# Patient Record
Sex: MALE | Race: WHITE | Employment: UNEMPLOYED | URBAN - METROPOLITAN AREA
[De-identification: names, ages, dates, MRNs, and addresses within clinical notes are randomized per-mention and may not be internally consistent; named-entity substitution may affect disease eponyms.]

---

## 2022-01-10 ENCOUNTER — OFFICE VISIT (OUTPATIENT)
Dept: SURGERY | Facility: CLINIC | Age: 62
End: 2022-01-10
Payer: MEDICAID

## 2022-01-10 DIAGNOSIS — D21.12 BENIGN NEOPLASM OF CONNECTIVE AND SOFT TISSUE OF LEFT UPPER EXTREMITY, INCLUDING SHOULDER: Primary | ICD-10-CM

## 2022-01-10 PROCEDURE — 99243 OFF/OP CNSLTJ NEW/EST LOW 30: CPT | Performed by: PLASTIC SURGERY

## 2022-01-10 RX ORDER — HYDROCODONE BITARTRATE AND ACETAMINOPHEN 7.5; 325 MG/1; MG/1
1 TABLET ORAL
Qty: 10 TABLET | Refills: 0 | Status: SHIPPED | OUTPATIENT
Start: 2022-01-10

## 2022-01-10 NOTE — H&P
Maykel Daniel is a 57year old male that presents with Patient presents with:  Ganglion: Left hand   .     REFERRED BY:  Loretta Rodriguez    Pacemaker: No  Latex Allergy: no  Coumadin: No  Plavix: No  Other anticoagulants: No  Cardiac stents: No    HAND D Right: Normal, Left: Normal  NECK/THYROID: Inspection - Normal, Palpation - Normal, Thyroid gland - Normal, No adenopathy  RESPIRATORY: Inspection - Normal, Effort - Normal  CARDIOVASCULAR: Regular rhythm, No murmurs  ABDOMEN: Inspection - Normal, No abdom

## 2022-01-10 NOTE — PROGRESS NOTES
Patient request for surgery signed by patient and witnessed and signed by RN. Prescription for Norco and narcotic prescription electronically sent to pharmacy per Dr. Anne Worrell order and patient instructed to  prescription before surgery.    Pre-

## 2022-01-10 NOTE — H&P (VIEW-ONLY)
Essie Adamson is a 57year old male that presents with Patient presents with:  Ganglion: Left hand   . REFERRED BY:  Loretta Rodriguez    Pacemaker: No  Latex Allergy: no  Coumadin: No  Plavix: No  Other anticoagulants: No  Cardiac stents: No    HAND DOMINANCE:  Right  Profession: Unemployed     RECONSTRUCTIVE HISTORY    SUN EXPOSURE   Current yes   Past yes   Sunburns no   Tanning salons current no   Tanning salons past no   Radiation treatments No     SKIN CANCER    Personal history of skin cancer: none    HAND     Previous hand injury (personal)    No      HPI:       58-year-old male right-hand-dominant with a left hand mass    10 years duration, recently increasing in size    No pain    Has difficulty grasping items  Numbness to index, middle, ring, small          Review of Systems:   Constitutional: No change in appetite, chill/rigors, or fatigue  GI: No jaundice  Endocrine: No generalized weakness  Neurological: No aphasia, loss of consciousness, or seizures    Musculoskeletal:     Duration  ~ 10 years, recently increasing in size     Location:            Left  Volar hand        Size: increased    Pain:  no    Functional problems:  yes difficulty grasping items     Spontaneously occurred   Constant numbness to all left hand fingers   ~ 2.5 x 2.5 round, firm mass       PMH:     MEDICAL  History reviewed. No pertinent past medical history. SURGICAL  History reviewed. No pertinent surgical history. ALLERIGIES  No Known Allergies     MEDICATIONS  No current outpatient medications on file. SOCIAL HISTORY  Social History    Tobacco Use      Smoking status: Current Every Day Smoker      Smokeless tobacco: Never Used    Alcohol use: Not on file    Drug use: Not on file       FAMILY HISTORY  History reviewed. No pertinent family history.        PHYSICAL EXAM:     CONSTITUTIONAL: Overall appearance - Normal  HEENT: Normocephalic  EYES: Conjunctiva - Right: Normal, Left: Normal; EOMI  EARS: Inspection - Right: Normal, Left: Normal  NECK/THYROID: Inspection - Normal, Palpation - Normal, Thyroid gland - Normal, No adenopathy  RESPIRATORY: Inspection - Normal, Effort - Normal  CARDIOVASCULAR: Regular rhythm, No murmurs  ABDOMEN: Inspection - Normal, No abdominal tenderness  NEURO: Memory intact  PSYCH: Oriented to person, place, time, and situation, Appropriate mood and affect      Hand Physical Exam:     Left volar radial palm: 2 cm firm cystic mass with overlying thin skin    No Tinel's over the mass    Tinel median wrist positive and exploring    ASSESSMENT/PLAN:     MASS      left palm    We discussed what a ganglion/mass is, including treatment options. Questions were answered and the patient wishes to proceed with treatment. This ganglion/mass should be excised. I discussed the procedure at length, including post-operative course and risks as indicated on the Surgical Request Form. Therapy will be necessary post-operatively. The mass could recur (1-5%). POST-OPERATIVE  PROTOCOL:  We discussed post-operative restrictions at length. It is critical to maintain the splint post-operatively and to comply with post-operative instructions. The splint must be carefully cared for, must remain dry,  and cannot be removed under any circumstance. Consistent elevation of the hand above heart level is critical.  Therapy will be necessary post-operatively. Failure to comply with post-operative instructions, including therapy, will have significant impact on the final result, and could lead to permanent pain, stiffness, weakness, and loss of function. Even with satisfactory healing, the hand/digit may not regain normal ROM or normal function. Skin excision will necessitate the need for a local flap. The numbness is not from the mass, but probably early carpal tunnel. 1/10/2022  Eleuterio Ambrose MD      [facility] Data Reviewed.

## 2022-01-27 ENCOUNTER — LAB ENCOUNTER (OUTPATIENT)
Dept: LAB | Facility: HOSPITAL | Age: 62
End: 2022-01-27
Attending: PLASTIC SURGERY
Payer: MEDICAID

## 2022-01-27 DIAGNOSIS — Z01.818 PREOP TESTING: ICD-10-CM

## 2022-01-27 LAB — SARS-COV-2 RNA RESP QL NAA+PROBE: NOT DETECTED

## 2022-01-28 ENCOUNTER — HOSPITAL DOCUMENTATION (OUTPATIENT)
Dept: SURGERY | Facility: CLINIC | Age: 62
End: 2022-01-28

## 2022-01-28 ENCOUNTER — ANESTHESIA EVENT (OUTPATIENT)
Dept: SURGERY | Facility: HOSPITAL | Age: 62
End: 2022-01-28
Payer: MEDICAID

## 2022-01-28 ENCOUNTER — HOSPITAL ENCOUNTER (OUTPATIENT)
Facility: HOSPITAL | Age: 62
Setting detail: HOSPITAL OUTPATIENT SURGERY
Discharge: HOME | End: 2022-01-28
Attending: PLASTIC SURGERY | Admitting: PLASTIC SURGERY
Payer: MEDICAID

## 2022-01-28 ENCOUNTER — ANESTHESIA (OUTPATIENT)
Dept: SURGERY | Facility: HOSPITAL | Age: 62
End: 2022-01-28
Payer: MEDICAID

## 2022-01-28 VITALS
BODY MASS INDEX: 20.05 KG/M2 | HEART RATE: 67 BPM | TEMPERATURE: 97 F | HEIGHT: 72 IN | RESPIRATION RATE: 16 BRPM | DIASTOLIC BLOOD PRESSURE: 84 MMHG | SYSTOLIC BLOOD PRESSURE: 148 MMHG | OXYGEN SATURATION: 95 % | WEIGHT: 148 LBS

## 2022-01-28 DIAGNOSIS — L72.0 EPIDERMAL CYST: Primary | ICD-10-CM

## 2022-01-28 DIAGNOSIS — D21.12 BENIGN NEOPLASM OF CONNECTIVE AND SOFT TISSUE OF LEFT UPPER EXTREMITY, INCLUDING SHOULDER: ICD-10-CM

## 2022-01-28 DIAGNOSIS — Z01.818 PREOP TESTING: Primary | ICD-10-CM

## 2022-01-28 PROCEDURE — 0HXGXZZ TRANSFER LEFT HAND SKIN, EXTERNAL APPROACH: ICD-10-PCS | Performed by: PLASTIC SURGERY

## 2022-01-28 PROCEDURE — 0HBGXZZ EXCISION OF LEFT HAND SKIN, EXTERNAL APPROACH: ICD-10-PCS | Performed by: PLASTIC SURGERY

## 2022-01-28 PROCEDURE — 14040 TIS TRNFR F/C/C/M/N/A/G/H/F: CPT | Performed by: PLASTIC SURGERY

## 2022-01-28 PROCEDURE — 0JCK0ZZ EXTIRPATION OF MATTER FROM LEFT HAND SUBCUTANEOUS TISSUE AND FASCIA, OPEN APPROACH: ICD-10-PCS | Performed by: PLASTIC SURGERY

## 2022-01-28 RX ORDER — PROCHLORPERAZINE EDISYLATE 5 MG/ML
5 INJECTION INTRAMUSCULAR; INTRAVENOUS ONCE AS NEEDED
Status: DISCONTINUED | OUTPATIENT
Start: 2022-01-28 | End: 2022-01-28

## 2022-01-28 RX ORDER — LIDOCAINE HYDROCHLORIDE 10 MG/ML
INJECTION, SOLUTION EPIDURAL; INFILTRATION; INTRACAUDAL; PERINEURAL AS NEEDED
Status: DISCONTINUED | OUTPATIENT
Start: 2022-01-28 | End: 2022-01-28 | Stop reason: SURG

## 2022-01-28 RX ORDER — HYDROMORPHONE HYDROCHLORIDE 1 MG/ML
0.4 INJECTION, SOLUTION INTRAMUSCULAR; INTRAVENOUS; SUBCUTANEOUS EVERY 5 MIN PRN
Status: DISCONTINUED | OUTPATIENT
Start: 2022-01-28 | End: 2022-01-28

## 2022-01-28 RX ORDER — HALOPERIDOL 5 MG/ML
0.25 INJECTION INTRAMUSCULAR ONCE AS NEEDED
Status: DISCONTINUED | OUTPATIENT
Start: 2022-01-28 | End: 2022-01-28

## 2022-01-28 RX ORDER — MORPHINE SULFATE 4 MG/ML
4 INJECTION, SOLUTION INTRAMUSCULAR; INTRAVENOUS EVERY 10 MIN PRN
Status: DISCONTINUED | OUTPATIENT
Start: 2022-01-28 | End: 2022-01-28

## 2022-01-28 RX ORDER — ONDANSETRON 2 MG/ML
4 INJECTION INTRAMUSCULAR; INTRAVENOUS ONCE AS NEEDED
Status: DISCONTINUED | OUTPATIENT
Start: 2022-01-28 | End: 2022-01-28

## 2022-01-28 RX ORDER — SODIUM CHLORIDE, SODIUM LACTATE, POTASSIUM CHLORIDE, CALCIUM CHLORIDE 600; 310; 30; 20 MG/100ML; MG/100ML; MG/100ML; MG/100ML
INJECTION, SOLUTION INTRAVENOUS CONTINUOUS
Status: DISCONTINUED | OUTPATIENT
Start: 2022-01-28 | End: 2022-01-28

## 2022-01-28 RX ORDER — EPHEDRINE SULFATE 50 MG/ML
INJECTION INTRAVENOUS AS NEEDED
Status: DISCONTINUED | OUTPATIENT
Start: 2022-01-28 | End: 2022-01-28 | Stop reason: SURG

## 2022-01-28 RX ORDER — MORPHINE SULFATE 10 MG/ML
6 INJECTION, SOLUTION INTRAMUSCULAR; INTRAVENOUS EVERY 10 MIN PRN
Status: DISCONTINUED | OUTPATIENT
Start: 2022-01-28 | End: 2022-01-28

## 2022-01-28 RX ORDER — HYDROMORPHONE HYDROCHLORIDE 1 MG/ML
0.6 INJECTION, SOLUTION INTRAMUSCULAR; INTRAVENOUS; SUBCUTANEOUS EVERY 5 MIN PRN
Status: DISCONTINUED | OUTPATIENT
Start: 2022-01-28 | End: 2022-01-28

## 2022-01-28 RX ORDER — HYDROCODONE BITARTRATE AND ACETAMINOPHEN 7.5; 325 MG/1; MG/1
1 TABLET ORAL EVERY 4 HOURS PRN
Status: DISCONTINUED | OUTPATIENT
Start: 2022-01-28 | End: 2022-01-28

## 2022-01-28 RX ORDER — MORPHINE SULFATE 4 MG/ML
2 INJECTION, SOLUTION INTRAMUSCULAR; INTRAVENOUS EVERY 10 MIN PRN
Status: DISCONTINUED | OUTPATIENT
Start: 2022-01-28 | End: 2022-01-28

## 2022-01-28 RX ORDER — HYDROMORPHONE HYDROCHLORIDE 1 MG/ML
0.2 INJECTION, SOLUTION INTRAMUSCULAR; INTRAVENOUS; SUBCUTANEOUS EVERY 5 MIN PRN
Status: DISCONTINUED | OUTPATIENT
Start: 2022-01-28 | End: 2022-01-28

## 2022-01-28 RX ORDER — ACETAMINOPHEN 500 MG
1000 TABLET ORAL ONCE
Status: COMPLETED | OUTPATIENT
Start: 2022-01-28 | End: 2022-01-28

## 2022-01-28 RX ORDER — NALOXONE HYDROCHLORIDE 0.4 MG/ML
80 INJECTION, SOLUTION INTRAMUSCULAR; INTRAVENOUS; SUBCUTANEOUS AS NEEDED
Status: DISCONTINUED | OUTPATIENT
Start: 2022-01-28 | End: 2022-01-28

## 2022-01-28 RX ORDER — MIDAZOLAM HYDROCHLORIDE 1 MG/ML
INJECTION INTRAMUSCULAR; INTRAVENOUS AS NEEDED
Status: DISCONTINUED | OUTPATIENT
Start: 2022-01-28 | End: 2022-01-28 | Stop reason: SURG

## 2022-01-28 RX ORDER — KETOROLAC TROMETHAMINE 30 MG/ML
INJECTION, SOLUTION INTRAMUSCULAR; INTRAVENOUS AS NEEDED
Status: DISCONTINUED | OUTPATIENT
Start: 2022-01-28 | End: 2022-01-28 | Stop reason: SURG

## 2022-01-28 RX ORDER — HYDROCODONE BITARTRATE AND ACETAMINOPHEN 5; 325 MG/1; MG/1
1 TABLET ORAL AS NEEDED
Status: DISCONTINUED | OUTPATIENT
Start: 2022-01-28 | End: 2022-01-28

## 2022-01-28 RX ORDER — HYDROCODONE BITARTRATE AND ACETAMINOPHEN 5; 325 MG/1; MG/1
2 TABLET ORAL AS NEEDED
Status: DISCONTINUED | OUTPATIENT
Start: 2022-01-28 | End: 2022-01-28

## 2022-01-28 RX ADMIN — EPHEDRINE SULFATE 10 MG: 50 INJECTION INTRAVENOUS at 11:48:00

## 2022-01-28 RX ADMIN — SODIUM CHLORIDE, SODIUM LACTATE, POTASSIUM CHLORIDE, CALCIUM CHLORIDE: 600; 310; 30; 20 INJECTION, SOLUTION INTRAVENOUS at 11:26:00

## 2022-01-28 RX ADMIN — KETOROLAC TROMETHAMINE 30 MG: 30 INJECTION, SOLUTION INTRAMUSCULAR; INTRAVENOUS at 12:29:00

## 2022-01-28 RX ADMIN — SODIUM CHLORIDE, SODIUM LACTATE, POTASSIUM CHLORIDE, CALCIUM CHLORIDE: 600; 310; 30; 20 INJECTION, SOLUTION INTRAVENOUS at 12:38:00

## 2022-01-28 RX ADMIN — LIDOCAINE HYDROCHLORIDE 50 MG: 10 INJECTION, SOLUTION EPIDURAL; INFILTRATION; INTRACAUDAL; PERINEURAL at 11:30:00

## 2022-01-28 RX ADMIN — MIDAZOLAM HYDROCHLORIDE 2 MG: 1 INJECTION INTRAMUSCULAR; INTRAVENOUS at 11:30:00

## 2022-01-28 NOTE — ANESTHESIA PROCEDURE NOTES
Airway  Date/Time: 1/28/2022 11:31 AM  Urgency: Elective    Airway not difficult    General Information and Staff    Patient location during procedure: OR  Anesthesiologist: Cy Montaño MD  Performed: anesthesiologist     Indications and Patient Co

## 2022-01-28 NOTE — INTERVAL H&P NOTE
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 I discussed with the patient and/or legal representative the potential benefits, risks and side effects of this procedure; the likelihood of the patient achieving goals; and potential problems that might occur during recuperation. I discussed reasonable alternatives to the procedure, including risks, benefits and side effects related to the alternatives and risks related to not receiving this procedure. We will proceed with procedure as planned.

## 2022-01-28 NOTE — BRIEF OP NOTE
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

## 2022-01-28 NOTE — ANESTHESIA POSTPROCEDURE EVALUATION
Patient: Ziggy Moran    Procedure Summary     Date: 01/28/22 Room / Location: [address] [place] [address] MAIN OR 01 / [facility] MAIN OR    Anesthesia Start: 4387 Anesthesia Stop:     Procedure: EXCISION OF MASS OF LEFT HAND (Left Hand) Diagnosis:       Benign neoplasm of connecti

## 2022-01-28 NOTE — ANESTHESIA PREPROCEDURE EVALUATION
Anesthesia PreOp Note    HPI:     Brandi Hairston is a 57year old male who presents for preoperative consultation requested by: Pedro Pablo Titus MD    Date of Surgery: 1/28/2022    Procedure(s):  EXCISION OF MASS OF LEFT HAND  Indication: Benign ne Never used    Substance and Sexual Activity      Alcohol use: Not Currently      Drug use: Not Currently      Sexual activity: Not on file    Other Topics      Concerns:        Left Handed: Not Asked        Right Handed: Yes        Currently spends a great anesthetic management as planned.   Shyann Craven MD  1/28/2022 10:14 AM

## 2022-01-29 ENCOUNTER — TELEPHONE (OUTPATIENT)
Dept: SURGERY | Facility: CLINIC | Age: 62
End: 2022-01-29

## 2022-01-29 NOTE — TELEPHONE ENCOUNTER
Left message for post-operative patient to please call the office with any questions and/or concerns. Reminded patient to call the office on Monday to make an additional OT appointment on 1/31/22 RN scheduled appointment is on 2/9.   Dr. Epifanio العلي notified

## 2022-01-31 ENCOUNTER — TELEPHONE (OUTPATIENT)
Dept: SURGERY | Facility: CLINIC | Age: 62
End: 2022-01-31

## 2022-01-31 NOTE — OPERATIVE REPORT
[facility]    PATIENT'S NAME: Lilia Smith   ATTENDING PHYSICIAN: Shreyas Ocampo MD   OPERATING PHYSICIAN: Shreyas Ocampo MD   PATIENT ACCOUNT#:   [de-identified]    LOCATION:  [address]  MEDICAL RECORD #:   [id number]       YOB: 1960  ADMISSION DATE:       01/28/2022      OPERATION DATE:  01/28/2022    OPERATIVE REPORT    PREOPERATIVE DIAGNOSIS:  Left hand mass. POSTOPERATIVE DIAGNOSIS:  Left hand mass, pending pathology report, plus foreign body. PROCEDURE:  Excision of left hand mass and foreign body, transposition flap reconstruction. INDICATIONS:  A 25-year-old male, right hand dominant, with a 10-year history of a mass which has been enlarging. He now has numbness in the index, middle, and ring, and he has difficulty grasping. He is admitted to the operating amphitheater for excision. FINDINGS:  A 2 cm cystic mass with significantly thinned overlying skin is present on the midradial palm. He has subjective numbness in the index, middle, and ring. There is no Tinel's over the mass or over the median nerve. At surgery, there was found a mass that penetrates the palmar fascia and overlies the common digital neurovascular bundle of the index and middle, as well as the flexor sheath of the middle. A 1 cm fragmented wooden foreign body, which appears to be a splinter, is present. OPERATIVE TECHNIQUE:  The patient was placed under general anesthesia. Hand and forearm were prepped and draped in the usual sterile fashion. A pneumatic tourniquet was inflated to 250 mmHg. A zigzag incision was made. Skin flaps were elevated off the mass. The skin was markedly thinned with essentially epidermis present over most of the mass. We dissected through the penetration of the palmar fascia and peeled the mass from underlying structures from ulnar to radial, first the flexor sheath of the middle finger. The flexor tendons were intact.   As we coursed radially, we peeled it off the lumbrical muscle as well as the common digital neurovascular bundle of the index and middle. On the underside of the mass was a fragmented 1 cm wooden foreign body. There was no gross infection. We excised thin skin, which left us with a sizable defect in the palm. We incised proximally and ulnarly and elevated an ulnarly-based V-shaped transposition flap to fill in the soft tissue defect without tension. The skin edges were reapproximated with 4-0 nylon. A bulky soft dressing was placed. The tourniquet was released. Total tourniquet time 39 minutes. The patient tolerated the procedure well and left the operating suite in satisfactory condition.     Dictated By Elaina Phillips MD  d: 01/28/2022 12:43:27  t: 01/28/2022 13:55:40  Job [id number]  RVJ/    cc: MD Dr. Vitaliy Reid

## 2022-01-31 NOTE — TELEPHONE ENCOUNTER
Spoke w/ patient. Patient told per Dr. Jenny Rogers pathology results from surgery,  Was a benign cyst.  Patient Verbalized understanding.   Pt made aware per Dr. Jenny Rogers request to scheduled appointment today for OT, appointment made today at [address] [place] [address]

## 2022-02-01 ENCOUNTER — OFFICE VISIT (OUTPATIENT)
Dept: SURGERY | Facility: CLINIC | Age: 62
End: 2022-02-01
Payer: MEDICAID

## 2022-02-01 DIAGNOSIS — M25.642 STIFFNESS OF JOINT, HAND, LEFT: Primary | ICD-10-CM

## 2022-02-01 DIAGNOSIS — M62.81 DISTAL MUSCLE WEAKNESS: ICD-10-CM

## 2022-02-01 PROCEDURE — 97110 THERAPEUTIC EXERCISES: CPT | Performed by: OCCUPATIONAL THERAPIST

## 2022-02-01 PROCEDURE — 97166 OT EVAL MOD COMPLEX 45 MIN: CPT | Performed by: OCCUPATIONAL THERAPIST

## 2022-02-01 NOTE — PROGRESS NOTES
OCCUPATIONAL THERAPY EVALUATION:   [facility]   [id number]       SUBJECTIVE:    HX of Injury: Left Hand Mass FB / Flap. Chief Complaint:   NO complaints. Precautions: Keep surgical site clean and dry. Premorbid Functional Status: Independent w/ driving / sitting, Independent w/ ADL's  Current Level of Function: As noted above  Employment: Unemployed  Hand Dominance: right  Living Situation: w/ spouse  Barriers to Learning: None  Patient Goals: Full use of the left hand    Imaging/Tests: N/A        OBJECTIVE DATA:   PAIN:   Rating (1/10): 1/10 at rest, 1/10 with activity  Location:     OBSERVATION:   Patient presents in post op dressings    ORTHOTICS:  N/A    SCAR/INCISION: Sutures are intact, clean and dry. SENSORY:  Intact      AROM/PROM:  (Degrees)  LEFT HAND:    Thumb IF MF RF SF   MP Full left hand composite fist without pain. PIP        DIP        VASQUEZ                  STRENGTH: (lbs) Right Average Left Average   : NT NT   2 pt Pinch:     3 pt Pinch:     Lateral Pinch:         ASSESSMENT & PLAN OF CARE:    Treatment Provided: Patient was seen for an initial evaluation, hand washing and dressing change, polysporin gauze and spandage:  HEP:  AROM, Tendon glides, x 20 reps per set, x 5 sets daily. Reviewed hand elevation importance. Written handout was provided to reinforce today's treatment and educational session. Rehabilitation Potential: Excellent    CLINICAL ASSESSMENT:    Patient/Caregiver Education Provided: Yes    Treatment Plan:  Therapeutic Exercise  Therapeutic Activities  Scar Management  Patient/Family Education    GOALS:  Short term goals to be reached in x 1 week:    1) Independent with HEP. .      Long term goals to be reached in x 2 - 3 weeks:    1) Full functional use of the involved extremity for self-care, leisure and work related tasks:  . Patient will be seen 2 x /week for 1.5 weeks or a total of 3 visits.    Pt. was advised regarding the findings of this evaluation and agrees to the plan of care. Jose Kirby I have reviewed the treatment plan and concur.    Nina Hopson MD

## 2022-02-09 ENCOUNTER — NURSE ONLY (OUTPATIENT)
Dept: SURGERY | Facility: CLINIC | Age: 62
End: 2022-02-09
Payer: MEDICAID

## 2022-02-09 ENCOUNTER — OFFICE VISIT (OUTPATIENT)
Dept: SURGERY | Facility: CLINIC | Age: 62
End: 2022-02-09
Payer: MEDICAID

## 2022-02-09 DIAGNOSIS — Z48.02 ENCOUNTER FOR REMOVAL OF SUTURES: Primary | ICD-10-CM

## 2022-02-09 DIAGNOSIS — M25.642 STIFFNESS OF JOINT, HAND, LEFT: Primary | ICD-10-CM

## 2022-02-09 DIAGNOSIS — L72.0 EPIDERMAL CYST: ICD-10-CM

## 2022-02-09 DIAGNOSIS — M62.81 DISTAL MUSCLE WEAKNESS: ICD-10-CM

## 2022-02-09 PROCEDURE — 97110 THERAPEUTIC EXERCISES: CPT | Performed by: OCCUPATIONAL THERAPIST

## 2022-02-09 NOTE — PROGRESS NOTES
Subjective: I do not have pain. Objective:     Current level of performance:  ADL: Independent  Work: N/A  Leisure: Not addressed    Measurements/Tests:  ROM:         Full left hand composite fist without pain. Treatment Provided this day: Following suture removal by nursing, OT reviewed cold cream scar massage technique, as well as therapeutic exercises, as previously documented. Treatment Time: 20 minutes      Summary/Analysis of Treatment session: No further OT needs at this time       Plan: Discontinue OT. Follow up in: To call with questions and or concerns. Jamal Lemos  OTR/L      I have reviewed the treatment plan and concur.    Filiberto Severs, MD

## 2022-02-09 NOTE — PROGRESS NOTES
Surgery 1: LH mass/FB/flap  - Date: 01/28/22  - Days Since: 12    Pt here for suture removal to left palm  Pt identified w/2 identifiers and orders verified. Pt presents w/spandage C/D/I. Pt denies c/o pain, use of analgesics, and s/s infection. Dressing removed carefully. Sutures C/D/I. Incision appears to be healing well, edges well approximated. Sutures removed without difficulty and pt tolerated procedure well. Escorted to OT appointment as scheduled  Pt instructed to call the office w/any further questions and/or concerns. Dr. Mansoor Singh notified.     SR left hand

## 2023-04-27 ENCOUNTER — OFFICE VISIT (OUTPATIENT)
Dept: SURGERY | Facility: CLINIC | Age: 63
End: 2023-04-27

## 2023-04-27 DIAGNOSIS — R20.0 NUMBNESS OF LEFT HAND: Primary | ICD-10-CM

## 2023-04-27 PROCEDURE — 99213 OFFICE O/P EST LOW 20 MIN: CPT | Performed by: PLASTIC SURGERY

## 2023-04-27 NOTE — PROGRESS NOTES
Per Dr. Epifanio العلي dispensed left medium wrist splint, fir comfortable and instructed to wear at night time only.  Verbalized understanding

## (undated) DEVICE — DISPOSABLE TOURNIQUET CUFF SINGLE BLADDER, DUAL PORT AND QUICK CONNECT CONNECTOR: Brand: COLOR CUFF

## (undated) DEVICE — PLASTIC HAND: Brand: MEDLINE INDUSTRIES, INC.

## (undated) DEVICE — GAMMEX® PI HYBRID SIZE 7, STERILE POWDER-FREE SURGICAL GLOVE, POLYISOPRENE AND NEOPRENE BLEND: Brand: GAMMEX

## (undated) DEVICE — SUTURE ETHILON 4-0 699G

## (undated) DEVICE — SUPER SPONGES,MEDIUM: Brand: KERLIX

## (undated) DEVICE — SOL  .9 1000ML BTL

## (undated) DEVICE — INTENDED FOR TISSUE SEPARATION, AND OTHER PROCEDURES THAT REQUIRE A SHARP SURGICAL BLADE TO PUNCTURE OR CUT.: Brand: BARD-PARKER ® STAINLESS STEEL BLADES

## (undated) NOTE — LETTER
01/10/22      Patient: Demario Telles  : 1960 Visit date: 1/10/2022    Dear Prabhakar Joiner,      I examined your patient in consultation today. He has a cystic mass of the left palm which is painful. We will plan on excision.     Thank dedrick